# Patient Record
Sex: FEMALE | Race: BLACK OR AFRICAN AMERICAN | NOT HISPANIC OR LATINO | URBAN - METROPOLITAN AREA
[De-identification: names, ages, dates, MRNs, and addresses within clinical notes are randomized per-mention and may not be internally consistent; named-entity substitution may affect disease eponyms.]

---

## 2017-03-03 ENCOUNTER — OFFICE (OUTPATIENT)
Dept: URBAN - METROPOLITAN AREA CLINIC 11 | Facility: CLINIC | Age: 47
End: 2017-03-03

## 2017-03-03 VITALS
WEIGHT: 242 LBS | HEART RATE: 85 BPM | DIASTOLIC BLOOD PRESSURE: 69 MMHG | SYSTOLIC BLOOD PRESSURE: 115 MMHG | HEIGHT: 66 IN

## 2017-03-03 DIAGNOSIS — R14.0 ABDOMINAL DISTENSION (GASEOUS): ICD-10-CM

## 2017-03-03 DIAGNOSIS — E66.9 OBESITY, UNSPECIFIED: ICD-10-CM

## 2017-03-03 DIAGNOSIS — R19.7 DIARRHEA, UNSPECIFIED: ICD-10-CM

## 2017-03-03 DIAGNOSIS — R10.30 LOWER ABDOMINAL PAIN, UNSPECIFIED: ICD-10-CM

## 2017-03-03 PROCEDURE — 99204 OFFICE O/P NEW MOD 45 MIN: CPT | Performed by: INTERNAL MEDICINE

## 2017-03-03 RX ORDER — COLESTIPOL HYDROCHLORIDE 1 G/1
TABLET, FILM COATED ORAL
Qty: 30 | Refills: 1 | Status: ACTIVE

## 2017-03-03 RX ORDER — SODIUM PICOSULFATE, MAGNESIUM OXIDE, AND ANHYDROUS CITRIC ACID 10; 3.5; 12 MG/16.1G; G/16.1G; G/16.1G
POWDER, METERED ORAL
Qty: 1 | Refills: 0 | Status: COMPLETED
Start: 2017-03-03 | End: 2017-03-06

## 2017-03-03 RX ORDER — HYOSCYAMINE SULFATE 0.12 MG/1
TABLET ORAL; SUBLINGUAL
Qty: 120 | Refills: 3 | Status: ACTIVE
Start: 2017-03-03

## 2017-03-03 NOTE — SERVICEHPINOTES
Ms. Conway is a 47-year-old female here for evaluation of chronic diarrhea and lower abdominal pain. She states that she has had her diarrhea issues for several years. Her lower abdominal pain did not start until about six months ago. She states that her diarrhea initially started after she had her gallbladder removed in 1992 but then at that time improved to where it was only on an intermittent basis. Over the past 4-5 years it has progressively worsened to now where she has a loose to watery bowel movement every time she eats, which is usually around four times a day. She denies any rectal bleeding. She normally does not have any regular bowel movements. She states that she had a colonoscopy nearly 10 years ago which did not reveal any etiology. Over the past six months she has been having some lower abdominal pain which is bilateral and radiates to her back. She states that this pain is worse whenever she bends over or stands up for a long time. It is better when she lays down and elevates her legs. There is no relation to bowel movements or eating. It is neither worse before bowel movements are better after bowel movements. She denies ever being evaluated for any back pain issues. She denies any heavy lifting. She states that she has had a total hysterectomy many years ago. She denies any weight loss. She has never tried a probiotic. She denies any recent antibiotics. She did have a CT scan ordered by Dr. Jacques that was performed in the middle of February 2017 that revealed some fatty infiltration of the liver with no evidence of any intestinal inflammation or obstruction.

## 2017-03-03 NOTE — SERVICENOTES
The patient does have long-standing diarrhea which could be due to bile acid diarrhea from her cholecystectomy, though it appears it has worsened over the past few years.  The differential also includes IBS with diarrhea, microscopic colitis, and less likely inflammatory bowel disease.  To further evaluate we will proceed with colonoscopy with biopsies.  If this is negative we will consider trial of Xifaxan.  In the meantime I will also give her a trial of colestipol to see this may help as her symptoms are worse after she eats.  Her lower abdominal pain does not appear to be related to her GI tract as it is not any better or worse with eating or any association with bowel habits.  It does radiate to her low back and she does have low back tenderness.  Her pain is also worse with movement and bending over which raises the question of whether this may be referred pain from disc impingement, etc.  I will go ahead and get a lower back films and I do recommend she discuss further with her PCP or consider seeing an orthopedic doctor.  In the meantime I will give her a trial of anti-spasmodic to see if this may help her pain if there is any relation to her change in bowel habits.

## 2017-03-06 ENCOUNTER — AMBULATORY SURGICAL CENTER (OUTPATIENT)
Dept: URBAN - METROPOLITAN AREA SURGERY 3 | Facility: SURGERY | Age: 47
End: 2017-03-06
Payer: COMMERCIAL

## 2017-03-06 ENCOUNTER — OFFICE (OUTPATIENT)
Dept: URBAN - METROPOLITAN AREA CLINIC 11 | Facility: CLINIC | Age: 47
End: 2017-03-06
Payer: COMMERCIAL

## 2017-03-06 VITALS
SYSTOLIC BLOOD PRESSURE: 123 MMHG | OXYGEN SATURATION: 98 % | OXYGEN SATURATION: 97 % | HEART RATE: 70 BPM | SYSTOLIC BLOOD PRESSURE: 123 MMHG | HEART RATE: 91 BPM | TEMPERATURE: 97.2 F | TEMPERATURE: 97.2 F | SYSTOLIC BLOOD PRESSURE: 120 MMHG | TEMPERATURE: 97 F | HEART RATE: 80 BPM | HEIGHT: 66 IN | RESPIRATION RATE: 18 BRPM | OXYGEN SATURATION: 93 % | OXYGEN SATURATION: 97 % | WEIGHT: 242 LBS | HEART RATE: 72 BPM | HEART RATE: 80 BPM | WEIGHT: 242 LBS | HEART RATE: 70 BPM | OXYGEN SATURATION: 93 % | DIASTOLIC BLOOD PRESSURE: 68 MMHG | DIASTOLIC BLOOD PRESSURE: 78 MMHG | HEIGHT: 66 IN | RESPIRATION RATE: 18 BRPM | DIASTOLIC BLOOD PRESSURE: 73 MMHG | DIASTOLIC BLOOD PRESSURE: 38 MMHG | HEART RATE: 76 BPM | DIASTOLIC BLOOD PRESSURE: 78 MMHG | RESPIRATION RATE: 16 BRPM | SYSTOLIC BLOOD PRESSURE: 128 MMHG | OXYGEN SATURATION: 98 % | DIASTOLIC BLOOD PRESSURE: 38 MMHG | HEART RATE: 91 BPM | RESPIRATION RATE: 16 BRPM | TEMPERATURE: 97 F | HEART RATE: 72 BPM | HEART RATE: 76 BPM | DIASTOLIC BLOOD PRESSURE: 83 MMHG | SYSTOLIC BLOOD PRESSURE: 120 MMHG | SYSTOLIC BLOOD PRESSURE: 128 MMHG | DIASTOLIC BLOOD PRESSURE: 83 MMHG | SYSTOLIC BLOOD PRESSURE: 113 MMHG | SYSTOLIC BLOOD PRESSURE: 113 MMHG | DIASTOLIC BLOOD PRESSURE: 73 MMHG | SYSTOLIC BLOOD PRESSURE: 132 MMHG | SYSTOLIC BLOOD PRESSURE: 132 MMHG | DIASTOLIC BLOOD PRESSURE: 68 MMHG

## 2017-03-06 DIAGNOSIS — R19.7 DIARRHEA, UNSPECIFIED: ICD-10-CM

## 2017-03-06 DIAGNOSIS — K64.0 FIRST DEGREE HEMORRHOIDS: ICD-10-CM

## 2017-03-06 PROBLEM — K52.89 CLINICALLY SIGNIFICANT DIARRHEA OF UNEXPLAINED ORIGIN: Status: ACTIVE | Noted: 2017-03-06

## 2017-03-06 PROCEDURE — 45380 COLONOSCOPY AND BIOPSY: CPT | Performed by: INTERNAL MEDICINE

## 2017-03-06 PROCEDURE — 88305 TISSUE EXAM BY PATHOLOGIST: CPT | Performed by: INTERNAL MEDICINE

## 2017-03-08 LAB
C DIFFICLIE TOXIN, PCR: C DIFF TOXIN BY PCR: NOT DETECTED
C DIFFICLIE TOXIN, PCR: SOURCE: (no result)
C-REACTIVE PROTEIN: 0.6 MG/DL — HIGH
CBC COMPLETE BLOOD COUNT W/O DIFF: HEMATOCRIT: 40.8 % (ref 36–48)
CBC COMPLETE BLOOD COUNT W/O DIFF: HEMOGLOBIN: 12.9 G/DL (ref 12–16)
CBC COMPLETE BLOOD COUNT W/O DIFF: MCH: 30.9 PG (ref 25–35)
CBC COMPLETE BLOOD COUNT W/O DIFF: MCHC: 31.6 % (ref 30–38)
CBC COMPLETE BLOOD COUNT W/O DIFF: MCV: 97.6 FL (ref 78–102)
CBC COMPLETE BLOOD COUNT W/O DIFF: PLATELET COUNT: 235 K/UL (ref 150–450)
CBC COMPLETE BLOOD COUNT W/O DIFF: RBC DISTRIBUTION WIDTH: 13.5 % (ref 11.5–16)
CBC COMPLETE BLOOD COUNT W/O DIFF: RED BLOOD CELL COUNT: 4.18 M/UL (ref 4–5.5)
CBC COMPLETE BLOOD COUNT W/O DIFF: WHITE BLOOD CELL COUNT: 5.3 K/UL (ref 4–11)
ENDOMYSIAL ANTIBODY, IGA: ENA IGA: NEGATIVE
IMMUNOGLOBULIN A: 236 MG/DL (ref 70–400)
STOOL CULTURE: C STOOL: (no result)
TISSUE TRANSGLUTAMINASE IGA AB: TTG IGA RESULT: <0.5 U/ML

## 2017-04-10 ENCOUNTER — OFFICE (OUTPATIENT)
Dept: URBAN - METROPOLITAN AREA CLINIC 11 | Facility: CLINIC | Age: 47
End: 2017-04-10

## 2017-04-10 VITALS
SYSTOLIC BLOOD PRESSURE: 117 MMHG | DIASTOLIC BLOOD PRESSURE: 70 MMHG | HEIGHT: 66 IN | HEART RATE: 88 BPM | WEIGHT: 241 LBS

## 2017-04-10 DIAGNOSIS — R10.30 LOWER ABDOMINAL PAIN, UNSPECIFIED: ICD-10-CM

## 2017-04-10 DIAGNOSIS — R49.0 DYSPHONIA: ICD-10-CM

## 2017-04-10 DIAGNOSIS — R09.89 OTHER SPECIFIED SYMPTOMS AND SIGNS INVOLVING THE CIRCULATORY: ICD-10-CM

## 2017-04-10 DIAGNOSIS — R19.7 DIARRHEA, UNSPECIFIED: ICD-10-CM

## 2017-04-10 DIAGNOSIS — E66.9 OBESITY, UNSPECIFIED: ICD-10-CM

## 2017-04-10 DIAGNOSIS — R14.0 ABDOMINAL DISTENSION (GASEOUS): ICD-10-CM

## 2017-04-10 PROCEDURE — 99213 OFFICE O/P EST LOW 20 MIN: CPT | Performed by: INTERNAL MEDICINE

## 2017-04-10 RX ORDER — NORTRIPTYLINE HYDROCHLORIDE 25 MG/1
CAPSULE ORAL
Qty: 60 | Refills: 6 | Status: ACTIVE
Start: 2017-04-10

## 2017-04-10 RX ORDER — DIPHENOXYLATE HYDROCHLORIDE AND ATROPINE SULFATE 2.5; .025 MG/1; MG/1
10 TABLET ORAL
Qty: 90 | Refills: 2 | Status: ACTIVE
Start: 2017-04-10

## 2017-04-10 RX ORDER — OMEPRAZOLE 40 MG/1
40 CAPSULE, DELAYED RELEASE ORAL
Qty: 30 | Refills: 1 | Status: ACTIVE
Start: 2017-04-10

## 2017-04-10 NOTE — SERVICEHPINOTES
Ms. Conway is a 47-year-old female here for follow up of chronic diarrhea and lower abdominal pain. She states that she has had her diarrhea issues for several years. Her lower abdominal pain did not start until about six months ago. She states that her diarrhea initially started after she had her gallbladder removed in 1992 but then at that time improved to where it was only on an intermittent basis. Over the past 4-5 years it has progressively worsened to now where she has a loose to watery bowel movement every time she eats, which is usually around four times a day. She denies any rectal bleeding. She did have a CT scan ordered by Dr. Jacques that was performed in the middle of February 2017 that revealed some fatty infiltration of the liver with no evidence of any intestinal inflammation or obstruction.The patient underwent basic workup with labs that were negative including celiac labs, stool studies, and normal CBC. CRP was just minimally elevated just above normal. Colonoscopy was performed and was completely normal to the terminal ileum. Her next colonoscopy is due in two years. Random colon biopsies were completely normal. Because of this I recommended a trial of Xifaxan which the patient for some reason is still taking as she states that she has only been taking the tablets twice a day and does admit that she has been skipping days. She also just started taking Colestid a few weeks ago and states that if she takes it three times a day it will cause constipation but she only takes it before breakfast and lunch and then she will have diarrhea at night. She otherwise denies any fevers or chills. Her weight is unchanged. She does note also that she has been having some difficulty with swallowing food and some hoarseness. She describes it as difficulty in getting it to go down into the esophagus. She feels a fullness in her neck. She denies any reflux or postnasal drip. She does not have any esophageal dysphagia type symptoms, but rather oropharyngeal symptoms. She states that whenever she seems she feels as though something is "going down the wrong pipe" and causing her to choke and cough. She has never had a stroke.ADDI